# Patient Record
Sex: MALE | Race: WHITE | Employment: FULL TIME | URBAN - METROPOLITAN AREA
[De-identification: names, ages, dates, MRNs, and addresses within clinical notes are randomized per-mention and may not be internally consistent; named-entity substitution may affect disease eponyms.]

---

## 2023-07-06 ENCOUNTER — TELEPHONE (OUTPATIENT)
Age: 33
End: 2023-07-06

## 2023-07-06 NOTE — TELEPHONE ENCOUNTER
----- Message from Ivett Leonardo MD sent at 7/6/2023 10:11 AM EDT -----  Please let the patient know most recent echocardiogram shows no major abnormalities

## 2023-07-26 ENCOUNTER — INITIAL CONSULT (OUTPATIENT)
Age: 33
End: 2023-07-26
Payer: OTHER GOVERNMENT

## 2023-07-26 VITALS
WEIGHT: 196 LBS | HEIGHT: 70 IN | BODY MASS INDEX: 28.06 KG/M2 | SYSTOLIC BLOOD PRESSURE: 128 MMHG | DIASTOLIC BLOOD PRESSURE: 100 MMHG | HEART RATE: 63 BPM

## 2023-07-26 DIAGNOSIS — I48.91 ATRIAL FIBRILLATION, UNSPECIFIED TYPE (HCC): Primary | ICD-10-CM

## 2023-07-26 PROCEDURE — 99244 OFF/OP CNSLTJ NEW/EST MOD 40: CPT | Performed by: INTERNAL MEDICINE

## 2023-07-26 PROCEDURE — 93000 ELECTROCARDIOGRAM COMPLETE: CPT | Performed by: INTERNAL MEDICINE

## 2024-01-31 ENCOUNTER — OFFICE VISIT (OUTPATIENT)
Age: 34
End: 2024-01-31
Payer: COMMERCIAL

## 2024-01-31 VITALS
DIASTOLIC BLOOD PRESSURE: 98 MMHG | WEIGHT: 202.7 LBS | HEART RATE: 69 BPM | BODY MASS INDEX: 29.02 KG/M2 | SYSTOLIC BLOOD PRESSURE: 148 MMHG | HEIGHT: 70 IN

## 2024-01-31 DIAGNOSIS — I48.91 ATRIAL FIBRILLATION, UNSPECIFIED TYPE (HCC): Primary | ICD-10-CM

## 2024-01-31 PROCEDURE — 93000 ELECTROCARDIOGRAM COMPLETE: CPT | Performed by: INTERNAL MEDICINE

## 2024-01-31 PROCEDURE — 99213 OFFICE O/P EST LOW 20 MIN: CPT | Performed by: INTERNAL MEDICINE

## 2024-01-31 NOTE — PROGRESS NOTES
[facility] CARDIOLOGY, [address], [address]  [place] [address]  PHONE: [phone]  Rizwan Morgan  1990    Chief Complant:    Chief Complaint   Patient presents with    Atrial Fibrillation      Consultation is requested by [unfilled] for evaluation of Atrial Fibrillation    Reason for Consultation: AF    History:  Rizwan Morgan is a very pleasant 33 y.o. male with a past medical and cardiac history significant for persistent AF requiring DCCV and presents for EP consultation for AF. He has been doing well since. He had the abrupt onset of rapid HR, palpitations, lightheadedness, fatigue. He had DCCV in ER, no recurrent AF. He has been doing well since last OV.    Cardiac PMH: (Old records have been reviewed and summarized below)  TTE (7/6/23): EF 60-65%    Reviewed office note Dr. Avila 6/12/23    Past Medical History, Past Surgical History, Family history, Social History, and Medications were all reviewed with the patient today and updated as necessary.     Current Outpatient Medications   Medication Sig Dispense Refill    metoprolol tartrate (LOPRESSOR) 25 MG tablet Take 1 tablet by mouth 2 times daily as needed (afib) 180 tablet 1    flecainide (TAMBOCOR) 100 MG tablet Take 100 mg daily as needed for AF. Must take with metropolol 20 tablet 0    apixaban (ELIQUIS) 5 MG TABS tablet Take 1 tablet by mouth 2 times daily 60 tablet 0     No current facility-administered medications for this visit.     No Known Allergies  [unfilled]    No past medical history on file.  No past surgical history on file.  No family history on file.  Social History     Tobacco Use    Smoking status: Former     Types: Cigarettes     Passive exposure: Past    Smokeless tobacco: Never   Substance Use Topics    Alcohol use: Not on file     PHYSICAL EXAM:   BP (!) 148/98   Pulse 69   Ht 1.778 m (5' 10\")   Wt 91.9 kg (202 lb 11.2 oz)   BMI 29.08 kg/m²      Wt Readings from Last 3 Encounters:   01/31/24 91.9 kg